# Patient Record
Sex: MALE | URBAN - NONMETROPOLITAN AREA
[De-identification: names, ages, dates, MRNs, and addresses within clinical notes are randomized per-mention and may not be internally consistent; named-entity substitution may affect disease eponyms.]

---

## 2024-11-18 ENCOUNTER — APPOINTMENT (RX ONLY)
Dept: URBAN - NONMETROPOLITAN AREA CLINIC 30 | Facility: CLINIC | Age: 59
Setting detail: DERMATOLOGY
End: 2024-11-18

## 2024-11-18 VITALS — WEIGHT: 238 LBS | HEIGHT: 70 IN

## 2024-11-18 DIAGNOSIS — B07.8 OTHER VIRAL WARTS: ICD-10-CM | Status: INADEQUATELY CONTROLLED

## 2024-11-18 DIAGNOSIS — D17 BENIGN LIPOMATOUS NEOPLASM: ICD-10-CM | Status: STABLE

## 2024-11-18 PROBLEM — D17.22 BENIGN LIPOMATOUS NEOPLASM OF SKIN AND SUBCUTANEOUS TISSUE OF LEFT ARM: Status: ACTIVE | Noted: 2024-11-18

## 2024-11-18 PROCEDURE — ? COUNSELING

## 2024-11-18 PROCEDURE — ? TREATMENT REGIMEN

## 2024-11-18 PROCEDURE — ? PRESCRIPTION

## 2024-11-18 PROCEDURE — 99203 OFFICE O/P NEW LOW 30 MIN: CPT

## 2024-11-18 RX ORDER — PHARMACY COMPOUNDING ACCESSORY
EACH MISCELLANEOUS
Qty: 5 | Refills: 2 | Status: ERX | COMMUNITY
Start: 2024-11-18

## 2024-11-18 RX ADMIN — Medication: at 00:00

## 2024-11-18 ASSESSMENT — LOCATION DETAILED DESCRIPTION DERM
LOCATION DETAILED: LEFT PROXIMAL DORSAL FOREARM
LOCATION DETAILED: RIGHT DISTAL PALMAR MIDDLE FINGER

## 2024-11-18 ASSESSMENT — LOCATION SIMPLE DESCRIPTION DERM
LOCATION SIMPLE: LEFT FOREARM
LOCATION SIMPLE: RIGHT MIDDLE FINGER

## 2024-11-18 ASSESSMENT — LOCATION ZONE DERM
LOCATION ZONE: ARM
LOCATION ZONE: FINGER

## 2024-11-18 NOTE — PROCEDURE: TREATMENT REGIMEN
Detail Level: Simple
Plan: Discuss wart peel, provided pt with wart peel information \\nRecheck in 6 weeks

## 2024-11-18 NOTE — HPI: WARTS ([email or web address])
How Severe Are Your Warts?: moderate
Is This A New Presentation, Or A Follow-Up?: Warts
Additional History: Previously treated with acid and ln2

## 2025-01-20 ENCOUNTER — APPOINTMENT (OUTPATIENT)
Dept: URBAN - NONMETROPOLITAN AREA CLINIC 30 | Facility: CLINIC | Age: 60
Setting detail: DERMATOLOGY
End: 2025-01-20

## 2025-01-20 DIAGNOSIS — B07.8 OTHER VIRAL WARTS: ICD-10-CM | Status: IMPROVED

## 2025-01-20 PROCEDURE — ? COUNSELING

## 2025-01-20 PROCEDURE — 99213 OFFICE O/P EST LOW 20 MIN: CPT

## 2025-01-20 PROCEDURE — ? PRESCRIPTION MEDICATION MANAGEMENT

## 2025-01-20 ASSESSMENT — LOCATION SIMPLE DESCRIPTION DERM: LOCATION SIMPLE: RIGHT MIDDLE FINGER

## 2025-01-20 ASSESSMENT — LOCATION DETAILED DESCRIPTION DERM: LOCATION DETAILED: RIGHT DISTAL PALMAR MIDDLE FINGER

## 2025-01-20 ASSESSMENT — LOCATION ZONE DERM: LOCATION ZONE: FINGER

## 2025-01-20 NOTE — PROCEDURE: PRESCRIPTION MEDICATION MANAGEMENT
Render In Strict Bullet Format?: No
Detail Level: Zone
Continue Regimen: Wart peel 3xs a week for 2mos for the remaining wart